# Patient Record
Sex: FEMALE | URBAN - METROPOLITAN AREA
[De-identification: names, ages, dates, MRNs, and addresses within clinical notes are randomized per-mention and may not be internally consistent; named-entity substitution may affect disease eponyms.]

---
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
## 2023-05-11 ENCOUNTER — ESTABLISHED PATIENT (OUTPATIENT)
Dept: URBAN - METROPOLITAN AREA CLINIC 29 | Facility: CLINIC | Age: 18
End: 2023-05-11

## 2023-05-11 DIAGNOSIS — H52.13: ICD-10-CM

## 2023-05-11 DIAGNOSIS — H52.223: ICD-10-CM

## 2023-05-11 PROCEDURE — 92310-2 LEVEL 2 CONTACT LENS MANAGEMENT

## 2023-05-11 PROCEDURE — 92015 DETERMINE REFRACTIVE STATE: CPT

## 2023-05-11 PROCEDURE — 92014 COMPRE OPH EXAM EST PT 1/>: CPT

## 2023-05-11 ASSESSMENT — VISUAL ACUITY
OS_SC: 20/400
OD_CC: 20/25
OD_SC: 20/400
OS_CC: 20/40-1
OU_SC: 20/30
OS_PH: 20/25

## 2023-05-11 ASSESSMENT — TONOMETRY
OD_IOP_MMHG: 15
OS_IOP_MMHG: 15
